# Patient Record
Sex: FEMALE | Race: ASIAN | URBAN - METROPOLITAN AREA
[De-identification: names, ages, dates, MRNs, and addresses within clinical notes are randomized per-mention and may not be internally consistent; named-entity substitution may affect disease eponyms.]

---

## 2017-12-20 NOTE — PATIENT DISCUSSION
Epiretinal Membrane Counseling: The diagnosis and natural history of epiretinal membrane was discussed with the patient including the risk of progression with retinal traction resulting in visual distortion. Patient instructed on how to do [address] to check for distortion in vision, The patient is instructed to call back immediately if any vision changes, and keep follow up as scheduled.

## 2018-12-19 NOTE — PATIENT DISCUSSION
Epiretinal Membrane Counseling: The diagnosis and natural history of epiretinal membrane was discussed with the patient including the risk of progression with retinal traction resulting in visual distortion. Patient instructed on how to do [address] to check for distortion in vision, The patient is instructed to call back immediately if any vision changes, and keep follow up as scheduled.

## 2018-12-19 NOTE — PATIENT DISCUSSION
DERMATOCHALASIS / PTOSIS RUL AND VISH :  VISUALLY SIGNIFICANT TO PATIENT. SCHEDULE WITH OCULOPLASTIC SPECIALIST IF PATIENT DESIRES.

## 2022-09-02 ENCOUNTER — NEW PATIENT (OUTPATIENT)
Dept: URBAN - METROPOLITAN AREA CLINIC 1 | Facility: CLINIC | Age: 42
End: 2022-09-02

## 2022-09-02 DIAGNOSIS — H10.9: ICD-10-CM

## 2022-09-02 DIAGNOSIS — H02.825: ICD-10-CM

## 2022-09-02 DIAGNOSIS — H25.13: ICD-10-CM

## 2022-09-02 DIAGNOSIS — H04.123: ICD-10-CM

## 2022-09-02 DIAGNOSIS — H16.143: ICD-10-CM

## 2022-09-02 PROCEDURE — 92004 COMPRE OPH EXAM NEW PT 1/>: CPT

## 2022-09-02 ASSESSMENT — VISUAL ACUITY
OD_CC: 20/20
OS_SC: 20/70+1

## 2022-09-02 ASSESSMENT — TONOMETRY
OD_IOP_MMHG: 13
OS_IOP_MMHG: 13